# Patient Record
(demographics unavailable — no encounter records)

---

## 2025-03-04 NOTE — HISTORY OF PRESENT ILLNESS
[Lower back] : lower back [de-identified] :  03/04/2025: She presents with her  for evaluation of chronic low back pain.  She is known to be a wheelchair ambulator for greater than 1 year.  She is also known to have significant contributory past medical history of diabetes and heart failure.  She is known to be on oxygen via nasal cannula she is also on Xarelto as a baseline.  She notes pain in the back is constant she feels weakness throughout both lower extremities and difficulty with ambulation.

## 2025-03-04 NOTE — HISTORY OF PRESENT ILLNESS
[Lower back] : lower back [de-identified] :  03/04/2025: She presents with her  for evaluation of chronic low back pain.  She is known to be a wheelchair ambulator for greater than 1 year.  She is also known to have significant contributory past medical history of diabetes and heart failure.  She is known to be on oxygen via nasal cannula she is also on Xarelto as a baseline.  She notes pain in the back is constant she feels weakness throughout both lower extremities and difficulty with ambulation.

## 2025-03-04 NOTE — PHYSICAL EXAM
[] : negative equivocal straight leg raise [FreeTextEntry3] : Lumbar exam is done while patient is in wheelchair [FreeTextEntry9] : n/a due to position  [de-identified] : 4/5 strength distally b/l

## 2025-03-04 NOTE — IMAGING
[FreeTextEntry1] : She has a mild degenerative scoliotic curve noted with multiple levels of bridging osteophytes throughout the thoracic go and lumbar regions [de-identified] : normal

## 2025-03-04 NOTE — ASSESSMENT
[FreeTextEntry1] : - She has chronic lower back pain with weakness in the lower extremities over the last year plus has required  her ambulation to use  a wheelchair  She has significant contributory past medical history that limits our treatment options due to her being on Xarelto and history of diabetes  Will get an MRI to evaluate lumbar stenosis/radiculopathy  She is scheduled tomorrow to see Dr. Manzano in regards to knee complaints  We will follow-up after MRI for review and delineation of treatment plan

## 2025-03-04 NOTE — PHYSICAL EXAM
[] : negative equivocal straight leg raise [FreeTextEntry3] : Lumbar exam is done while patient is in wheelchair [FreeTextEntry9] : n/a due to position  [de-identified] : 4/5 strength distally b/l

## 2025-03-04 NOTE — IMAGING
[FreeTextEntry1] : She has a mild degenerative scoliotic curve noted with multiple levels of bridging osteophytes throughout the thoracic go and lumbar regions [de-identified] : normal

## 2025-03-05 NOTE — HISTORY OF PRESENT ILLNESS
[10] : 10 [7] : 7 [Sharp] : sharp [Constant] : constant [Meds] : meds [de-identified] : 61 year old female with pain in the right knee, symptoms started a couple months ago worsened been present for a while , no specific injury and has been progressively getting worse. Pain with walking, transferring and even at rest. Had asp in past, she is on oxygen supplement, had heart valve replacement about one year ago, she is min ambulatory and has stair lift in house [] : no [FreeTextEntry1] : rt knee  [FreeTextEntry5] : No accident or injury [FreeTextEntry9] : Topical cream(Diclofenac), tylenol [de-identified] : Dr. Manzano

## 2025-03-05 NOTE — DISCUSSION/SUMMARY
[de-identified] : Patient allowed to gently start resuming activities. Discussed change to medication prescription and usage. Offered cortisone steroid injection.for pain control Bracing options discussed with patient. Hyaluronic Acid inj pamphlet given to pt. try topical lidocaine for pain reviewed current medications being used by this patient Home exercise for functional improvement

## 2025-03-05 NOTE — PHYSICAL EXAM
[5___] : hamstring 5[unfilled]/5 [Right] : right knee [All Views] : anteroposterior, lateral, skyline, and anteroposterior standing [There are no fractures, subluxations or dislocations. No significant abnormalities are seen] : There are no fractures, subluxations or dislocations. No significant abnormalities are seen [Degenerative change] : Degenerative change [] : no calf tenderness [advanced tricompartmental OA with medial compartment narrowing and varus alignment] : advanced tricompartmental OA with medial compartment narrowing and varus alignment [TWNoteComboBox7] : flexion 100 degrees [de-identified] : extension 5 degrees

## 2025-04-28 NOTE — DATA REVIEWED
[MRI] : MRI [Lumbar Spine] : lumbar spine [Report was reviewed and noted in the chart] : The report was reviewed and noted in the chart [I independently reviewed and interpreted images and report] : I independently reviewed and interpreted images and report [FreeTextEntry1] : @ [facility] 04/04/2025 1. Scoliosis with asymmetric collapse to the right at L2-3,3-4:  2. Multilevel spondylosis, mild spinal stenosis.

## 2025-04-28 NOTE — ASSESSMENT
[FreeTextEntry1] : 62 y/o F with chronic lower back pain with intermittent RLE radic. MRI:  Scoliosis with asymmetric collapse to the right at L2-3,3-4; diffuse spondylosis with mild spinal stenosis. Right knee pain responded well to CSI by Dr. Ray. No red flags on exam.   - Recommend a course of PT to strengthen core & BLE and gat training.   - Discussed overall no structural pathology, Meredith would benefit from continues non-surgical care by PM&R specialist. Therefore, she should f/up with the in 6 weeks after completing a course of PT.

## 2025-04-28 NOTE — PHYSICAL EXAM
[] : negative equivocal straight leg raise [FreeTextEntry3] : Lumbar exam is done while patient is in wheelchair [FreeTextEntry9] : n/a due to position  [de-identified] : 4/5 strength distally b/l

## 2025-04-28 NOTE — IMAGING
[FreeTextEntry1] : She has a mild degenerative scoliotic curve noted with multiple levels of bridging osteophytes throughout the thoracic go and lumbar regions [de-identified] : normal

## 2025-04-28 NOTE — HISTORY OF PRESENT ILLNESS
[Lower back] : lower back [de-identified] : 4/28/25: Follow up [facility]. Saw Dr. Manzano, had CSI in RT knee with good relief. MRI review @.   03/04/2025: She presents with her  for evaluation of chronic low back pain.  She is known to be a wheelchair ambulator for greater than 1 year.  She is also known to have significant contributory past medical history of diabetes and heart failure.  She is known to be on oxygen via nasal cannula she is also on Xarelto as a baseline.  She notes pain in the back is constant she feels weakness throughout both lower extremities and difficulty with ambulation.